# Patient Record
Sex: FEMALE | Race: BLACK OR AFRICAN AMERICAN | NOT HISPANIC OR LATINO | RURAL
[De-identification: names, ages, dates, MRNs, and addresses within clinical notes are randomized per-mention and may not be internally consistent; named-entity substitution may affect disease eponyms.]

---

## 2017-02-08 NOTE — PATIENT DISCUSSION
The patient is at  risk for a choroidal neovascular membrane. NO CNV SEEN TODAY. Dry ARMD is responsible for some decrease in vision.

## 2022-04-14 ENCOUNTER — EMERGENCY VISIT (OUTPATIENT)
Dept: RURAL CLINIC 1 | Facility: CLINIC | Age: 67
End: 2022-04-14

## 2022-04-14 DIAGNOSIS — H11.32: ICD-10-CM

## 2022-04-14 DIAGNOSIS — H25.13: ICD-10-CM

## 2022-04-14 PROCEDURE — 99212 OFFICE O/P EST SF 10 MIN: CPT

## 2022-04-14 ASSESSMENT — VISUAL ACUITY
OD_CC: 20/25
OS_CC: 20/40+2

## 2022-04-14 ASSESSMENT — TONOMETRY
OS_IOP_MMHG: 12
OD_IOP_MMHG: 10

## 2022-12-05 NOTE — PATIENT DISCUSSION
12 5 22 ON LEFT GAZE - STARTED 3 YEARS AGO - GETTING WORSE - TO GET NEUROLOGY EXAM FOR MYASTHENIA OR OTHER NEUROLOGIC CAUSES.

## 2023-03-02 NOTE — PATIENT DISCUSSION
Alert team RN at bedside.    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

## 2025-03-21 ENCOUNTER — TELEPHONE (OUTPATIENT)
Facility: CLINIC | Age: 70
End: 2025-03-21

## 2025-03-21 NOTE — TELEPHONE ENCOUNTER
----- Message from Lorna WALTER sent at 3/21/2025  8:26 AM EDT -----  Regarding: FW: [facility] Appointment Request    ----- Message -----  From: Deisi Duvall Nannetteregan  Sent: 3/20/2025   4:22 PM EDT  To: [facility] [place] Clinical Staff  Subject: [facility] Appointment Request                          [facility] Appointment Request    Patient needs appointment for [facility] Appointment Type: New Patient.    Patient Requested Dates(s): Any day (Patient requesting to be added in the cancellation wait list as a New patient)  Patient Requested Time:  Provider Name: Ivett Green MD    Reason for Appointment Request: New Patient - Requested Provider unavailable  --------------------------------------------------------------------------------------------------------------------------    Relationship to Patient: Self     Call Back Information: OK to leave message on voicemail  Preferred Call Back Number: Phone [phone]    Note: Patient is new, requesting to schedule an appointment with Dr. Ivett Green MD to establish care. Patient know that Dr. Green is not accepting new patient but she's requesting to be added in the cancellation wait list if possible.
